# Patient Record
Sex: FEMALE
[De-identification: names, ages, dates, MRNs, and addresses within clinical notes are randomized per-mention and may not be internally consistent; named-entity substitution may affect disease eponyms.]

---

## 2017-12-30 ENCOUNTER — HOSPITAL ENCOUNTER (EMERGENCY)
Dept: HOSPITAL 31 - C.ER | Age: 22
Discharge: HOME | End: 2017-12-30
Payer: MEDICAID

## 2017-12-30 VITALS — DIASTOLIC BLOOD PRESSURE: 84 MMHG | HEART RATE: 70 BPM | SYSTOLIC BLOOD PRESSURE: 115 MMHG

## 2017-12-30 VITALS — OXYGEN SATURATION: 97 %

## 2017-12-30 VITALS — RESPIRATION RATE: 16 BRPM | TEMPERATURE: 98.2 F

## 2017-12-30 DIAGNOSIS — S06.0X0A: Primary | ICD-10-CM
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
## 2017-12-30 NOTE — C.PDOC
History Of Present Illness


22 year old female presents to the ED c/o pain in the back of her head SP 

falling. Patient reports she was pulled by her dog which caused her to fall 

back on her stairs and hit the back of her head. Patient reports that when she 

went back to her apartment she vomited once, but reports she was able to 

tolerate fluids PTA. Patient denies LOC, weakness, numbness, blurry vision, 

other injury. 





- HPI


Time Seen by Provider: 12/30/17 20:56


Chief Complaint (Nursing): Trauma


History Per: Patient


History/Exam Limitations: no limitations


Onset/Duration Of Symptoms: Hrs


Injury Occurred (Timing): Hours Ago:


Location Of Injury: Posterior: Head


Severity: None


Recent travel outside of the [place]: No


Additional History Per: Patient





- Fall


Fall:Prior To Injury: Lost Balance





Past Medical History


Reviewed: Historical Data, Nursing Documentation, Vital Signs


Vital Signs: 


 Last Vital Signs











Temp  98.2 F   12/30/17 20:47


 


Pulse  70   12/30/17 21:47


 


Resp  16   12/30/17 21:47


 


BP  115/84   12/30/17 21:47


 


Pulse Ox  98   12/30/17 21:47














- Medical History


PMH: No Chronic Diseases


Surgical History: No Surg Hx


Family History: States: Unknown Family Hx





- Social History


Hx Alcohol Use: Yes


Hx Substance Use: No





- Immunization History


Hx Tetanus Toxoid Vaccination: No


Hx Influenza Vaccination: No





Review Of Systems


Constitutional: Negative for: Fever, Chills


Eyes: Negative for: Vision Change


Cardiovascular: Negative for: Chest Pain, Palpitations


Respiratory: Negative for: Cough, Shortness of Breath


Gastrointestinal: Positive for: Vomiting.  Negative for: Nausea, Abdominal Pain


Musculoskeletal: Negative for: Neck Pain


Neurological: Positive for: Headache.  Negative for: Weakness, Numbness, 

Dizziness





Physical Exam





- Physical Exam


Appears: Non-toxic, No Acute Distress


Skin: Normal Color, Warm, Dry


Head: Normacephalic, Tenderness (occipital area), Other (no palpable hematoma)


Eye(s): bilateral: Normal Inspection, PERRL, EOMI


Nose: No Discharge, No Deformity


Oral Mucosa: Moist


Neck: Normal ROM, No Midline Cervical Tenderness, No Paracervical Tenderness, 

Supple


Chest: Symmetrical


Cardiovascular: Rhythm Regular, No Murmur


Respiratory: Normal Breath Sounds, No Rales, No Rhonchi, No Wheezing


Gastrointestinal/Abdominal: Soft, No Tenderness


Back: Normal Inspection


Extremity: Normal ROM, No Pedal Edema, No Calf Tenderness, No Deformity, No 

Swelling


Neurological/Psych: Oriented x3, Normal Speech, Normal Cognition, Normal Motor, 

Normal Sensation


Gait: Steady





ED Course And Treatment


O2 Sat by Pulse Oximetry: 97 (On RA)


Pulse Ox Interpretation: Normal


Progress Note: Plan:  -Tylenol 975 mg PO.  I discussed the risk (radiation) and 

benefit (finding a problem needing surgery) with the patient.  The patient is 

acting normally and has a normal neurological exam. The likelihood of finding a 

lesion needing intervention on the CT scan is extremely low.  Patient agrees 

that at this time no CT scan will be done.  If there is any change or new 

concern, the patient will return to the ED for further evaluation.





Disposition


Counseled Patient/Family Regarding: Diagnosis, Need For Followup, Rx Given





- Disposition


Disposition: HOME/ ROUTINE


Disposition Time: 21:32


Condition: STABLE


Additional Instructions: 


Take tylenol or advil for pain





Follow up with PMD 





Return to ER if worse  


Instructions:  Concussion (ED)


Forms:  [facility] (English)


Print Language: Moldovan





- Clinical Impression


Clinical Impression: 


 Concussion with no loss of consciousness, Head injury








- PA / NP / Resident Statement


MD/DO has reviewed & agrees with the documentation as recorded.





- Scribe Statement


The provider has reviewed the documentation as recorded by the Scribe





Sha Cortes





All medical record entries made by the Scribe were at my direction and 

personally dictated by me. I have reviewed the chart and agree that the record 

accurately reflects my personal performance of the history, physical exam, 

medical decision making, and the department course for this patient. I have 

also personally directed, reviewed, and agree with the discharge instructions 

and disposition.

## 2018-01-09 ENCOUNTER — HOSPITAL ENCOUNTER (EMERGENCY)
Dept: HOSPITAL 31 - C.ER | Age: 23
Discharge: HOME | End: 2018-01-09
Payer: MEDICAID

## 2018-01-09 VITALS — BODY MASS INDEX: 28.3 KG/M2

## 2018-01-09 VITALS
TEMPERATURE: 98.9 F | SYSTOLIC BLOOD PRESSURE: 124 MMHG | DIASTOLIC BLOOD PRESSURE: 85 MMHG | RESPIRATION RATE: 18 BRPM | OXYGEN SATURATION: 99 % | HEART RATE: 81 BPM

## 2018-01-09 DIAGNOSIS — R10.13: Primary | ICD-10-CM

## 2018-01-09 DIAGNOSIS — R11.2: ICD-10-CM

## 2018-01-09 LAB
ALBUMIN SERPL-MCNC: 4.5 G/DL (ref 3.5–5)
ALBUMIN/GLOB SERPL: 1.3 {RATIO} (ref 1–2.1)
ALT SERPL-CCNC: 46 U/L (ref 9–52)
AST SERPL-CCNC: 30 U/L (ref 14–36)
BASOPHILS # BLD AUTO: 0.1 K/UL (ref 0–0.2)
BASOPHILS NFR BLD: 0.7 % (ref 0–2)
BILIRUB UR-MCNC: NEGATIVE MG/DL
BUN SERPL-MCNC: 11 MG/DL (ref 7–17)
CALCIUM SERPL-MCNC: 8.5 MG/DL (ref 8.6–10.4)
EOSINOPHIL # BLD AUTO: 0.1 K/UL (ref 0–0.7)
EOSINOPHIL NFR BLD: 1.3 % (ref 0–4)
ERYTHROCYTE [DISTWIDTH] IN BLOOD BY AUTOMATED COUNT: 13.8 % (ref 11.5–14.5)
GFR NON-AFRICAN AMERICAN: > 60
GLUCOSE UR STRIP-MCNC: NORMAL MG/DL
HCG,QUALITATIVE URINE: NEGATIVE
HGB BLD-MCNC: 14 G/DL (ref 11–16)
LEUKOCYTE ESTERASE UR-ACNC: (no result) LEU/UL
LYMPHOCYTES # BLD AUTO: 1.3 K/UL (ref 1–4.3)
LYMPHOCYTES NFR BLD AUTO: 11.9 % (ref 20–40)
MCH RBC QN AUTO: 29 PG (ref 27–31)
MCHC RBC AUTO-ENTMCNC: 33.6 G/DL (ref 33–37)
MCV RBC AUTO: 86.5 FL (ref 81–99)
MONOCYTES # BLD: 0.6 K/UL (ref 0–0.8)
MONOCYTES NFR BLD: 5.5 % (ref 0–10)
NEUTROPHILS # BLD: 8.9 K/UL (ref 1.8–7)
NEUTROPHILS NFR BLD AUTO: 80.6 % (ref 50–75)
NRBC BLD AUTO-RTO: 0 % (ref 0–2)
PH UR STRIP: 7 [PH] (ref 5–8)
PLATELET # BLD: 239 K/UL (ref 130–400)
PMV BLD AUTO: 10.4 FL (ref 7.2–11.7)
PROT UR STRIP-MCNC: NEGATIVE MG/DL
RBC # BLD AUTO: 4.82 MIL/UL (ref 3.8–5.2)
RBC # UR STRIP: NEGATIVE /UL
SP GR UR STRIP: 1.02 (ref 1–1.03)
SQUAMOUS EPITHIAL: 3 /HPF (ref 0–5)
URINE NITRATE: NEGATIVE
UROBILINOGEN UR-MCNC: NORMAL MG/DL (ref 0.2–1)
WBC # BLD AUTO: 11 K/UL (ref 4.8–10.8)

## 2018-01-09 PROCEDURE — 96375 TX/PRO/DX INJ NEW DRUG ADDON: CPT

## 2018-01-09 PROCEDURE — 96374 THER/PROPH/DIAG INJ IV PUSH: CPT

## 2018-01-09 PROCEDURE — 81001 URINALYSIS AUTO W/SCOPE: CPT

## 2018-01-09 PROCEDURE — 80053 COMPREHEN METABOLIC PANEL: CPT

## 2018-01-09 PROCEDURE — 87804 INFLUENZA ASSAY W/OPTIC: CPT

## 2018-01-09 PROCEDURE — 85025 COMPLETE CBC W/AUTO DIFF WBC: CPT

## 2018-01-09 PROCEDURE — 84703 CHORIONIC GONADOTROPIN ASSAY: CPT

## 2018-01-09 PROCEDURE — 99284 EMERGENCY DEPT VISIT MOD MDM: CPT

## 2018-01-09 NOTE — C.PDOC
History Of Present Illness


22 year old female, with no significant PMHx presents to ED for evaluation of 

body aches, chills, epigastric abdominal pain associated with nausea, 1 episode 

of non-bilious vomiting, and few episodes of watery diarrhea since 3:00 this 

morning. Denies fever, severe headache, dizziness, neck pain, drooling, 

dysphagia, dyspnea, chest pain, shortness of breath, cough, palpitation, 

hematemesis, dysuria, hematuria, urinary frequency, or back pain. Ambulate to 

ED for evaluation, not in any apparent distress.





Time Seen by Provider: 01/09/18 10:53


Chief Complaint (Nursing): Abdominal Pain


History Per: Patient


History/Exam Limitations: no limitations


Onset/Duration Of Symptoms: Hrs


Current Symptoms Are (Timing): Still Present


Location Of Pain/Discomfort: Epigastric


Quality Of Discomfort: "Pain"


Associated Symptoms: Chills, Nausea, Vomiting, Diarrhea.  denies: Loss Of 

Appetite, Back Pain, Chest Pain, Constipation, Urinary Symptoms


Exacerbating Factors: None


Alleviating Factors: None


Recent travel outside of the [place]: No


Additional History Per: Patient


Abnormal Vaginal Bleeding: No





Past Medical History


Reviewed: Historical Data, Nursing Documentation, Vital Signs


Vital Signs: 


 Last Vital Signs











Temp  98.9 F   01/09/18 13:28


 


Pulse  81   01/09/18 13:28


 


Resp  18   01/09/18 13:28


 


BP  124/85   01/09/18 13:28


 


Pulse Ox  99   01/09/18 13:28











Family History: States: Unknown Family Hx





- Social History


Hx Alcohol Use: Yes


Hx Substance Use: No





- Immunization History


Hx Tetanus Toxoid Vaccination: No


Hx Influenza Vaccination: No


Hx Pneumococcal Vaccination: No





Review Of Systems


Except As Marked, All Systems Reviewed And Found Negative.


Constitutional: Positive for: Chills, Other (body aches).  Negative for: Fever


Cardiovascular: Negative for: Chest Pain, Palpitations, Light Headedness


Respiratory: Negative for: Cough, Shortness of Breath


Gastrointestinal: Positive for: Nausea, Vomiting, Abdominal Pain, Diarrhea.  

Negative for: Constipation, Hematochezia, Hematemesis


Genitourinary: Negative for: Dysuria, Frequency, Hematuria, Vaginal Discharge


Musculoskeletal: Negative for: Back Pain





Physical Exam





- Physical Exam


Appears: Non-toxic, No Acute Distress


Skin: Normal Color, Warm, Dry, No Rash


Head: Normacephalic


Eye(s): bilateral: PERRL


Oral Mucosa: Moist, No Drooling


Neck: Supple


Cardiovascular: Rhythm Regular, No Murmur


Respiratory: No Accessory Muscle Use, No Rales, No Rhonchi, No Wheezing


Gastrointestinal/Abdominal: Soft, Tenderness (mild epigastric), No Guarding, No 

Rebound


Back: No CVA Tenderness


Extremity: Normal ROM, No Pedal Edema, No Deformity


Neurological/Psych: Oriented x3, Normal Speech





ED Course And Treatment





- Laboratory Results


Result Diagrams: 


 01/09/18 11:48





 01/09/18 11:48


Lab Interpretation: No Acute Changes


Urine Pregnancy POC: Negative


O2 Sat by Pulse Oximetry: 99 (RA)


Pulse Ox Interpretation: Normal


Progress Note: Blood work, urinalysis, Influenza AB was ordered and reviewed. 

Pt was given Zofran, Pepcid, Toradol, Carafate, and IV fluids.  Pt was OBS in 

Ed for 3 hours and remained stable.  On re-evaluation, pt is afebrile, 

hemodynamicaly stable.  Non-toxic. Tolerate Po well in Ed.  Ambulatory in Ed 

with stable gait.  PulseOx 99% RA.  ENT: no acute findings.  neck: Supple, (-) 

midline tenderness, (-) JVD, (-) carotid bruits B/L.  Lungs: CTA B/L, BS equal B

/L.  CVS: (+)S1S2, reg.  Abd: benign,  (-) guarding or rebound, (-) localized 

tenderness.  Back: (-) CVA tenderness.  Blood work review, appears normal.  UA 

normal, (-) preg.  Influenza (-).  Pt has clinical findings c/w epigastric pain

, N/V r/o viral illness.  Pt advised.  ref. to F/u with PMD, GI  in2 -3 days 

for re-eavl.  return to ED if any worsening ornew changes.





Disposition


Counseled Patient/Family Regarding: Studies Performed, Diagnosis, Need For 

Followup, Rx Given





- Disposition


Referrals: 


[facility] [Outside]


Disposition: HOME/ ROUTINE


Disposition Time: 12:34


Condition: STABLE


Additional Instructions: 


ENCOURAGE FLUIDS





DIET RESTRICTION FOR 1-2 DAYS





TAKE MEDICATION AS NEED





FOLLOW UP WITH PMD, GI IN 2-3 DAYS FOR RE-EVALUATION.


RETURN TO ED IF ANY WORSENING OR NEW CHANGES.


Prescriptions: 


Famotidine [Pepcid] 20 mg PO BID #10 tab


Instructions:  Acute Nausea and Vomiting (ED), Epigastric Pain (ED)


Forms:  [facility] (English)


Print Language: Dominican





- Clinical Impression


Clinical Impression: 


 Nausea, Vomiting, Epigastric pain








- PA / NP / Resident Statement


MD/DO has reviewed & agrees with the documentation as recorded.





- Scribe Statement


The provider has reviewed the documentation as recorded by the Inésibbrett Ball





All medical record entries made by the Inésibbrett were at my direction and 

personally dictated by me. I have reviewed the chart and agree that the record 

accurately reflects my personal performance of the history, physical exam, 

medical decision making, and the department course for this patient. I have 

also personally directed, reviewed, and agree with the discharge instructions 

and disposition.

## 2018-01-11 ENCOUNTER — HOSPITAL ENCOUNTER (EMERGENCY)
Dept: HOSPITAL 31 - C.ER | Age: 23
Discharge: HOME | End: 2018-01-11
Payer: MEDICAID

## 2018-01-11 VITALS
SYSTOLIC BLOOD PRESSURE: 137 MMHG | DIASTOLIC BLOOD PRESSURE: 83 MMHG | OXYGEN SATURATION: 99 % | HEART RATE: 72 BPM | TEMPERATURE: 98.2 F

## 2018-01-11 VITALS — BODY MASS INDEX: 28.3 KG/M2

## 2018-01-11 VITALS — RESPIRATION RATE: 20 BRPM

## 2018-01-11 DIAGNOSIS — S06.0X0A: Primary | ICD-10-CM

## 2018-01-11 DIAGNOSIS — R51: ICD-10-CM

## 2018-01-11 LAB
BILIRUB UR-MCNC: NEGATIVE MG/DL
GLUCOSE UR STRIP-MCNC: NORMAL MG/DL
HCG,QUALITATIVE URINE: NEGATIVE
LEUKOCYTE ESTERASE UR-ACNC: (no result) LEU/UL
PH UR STRIP: 5 [PH] (ref 5–8)
PROT UR STRIP-MCNC: NEGATIVE MG/DL
RBC # UR STRIP: NEGATIVE /UL
SP GR UR STRIP: 1.03 (ref 1–1.03)
SQUAMOUS EPITHIAL: 20 /HPF (ref 0–5)
URINE NITRATE: NEGATIVE
UROBILINOGEN UR-MCNC: 2 MG/DL (ref 0.2–1)

## 2018-01-11 NOTE — CT
PROCEDURE:  CT HEAD WITHOUT CONTRAST.



HISTORY:

fall



COMPARISON:

None available. 



TECHNIQUE:

Axial computed tomography images were obtained through the head/brain 

without intravenous contrast.  



Radiation dose:



Total exam DLP = 812 mGy-cm.



This CT exam was performed using one or more of the following dose 

reduction techniques: Automated exposure control, adjustment of the 

mA and/or kV according to patient size, and/or use of iterative 

reconstruction technique.



FINDINGS:



HEMORRHAGE:

No intracranial hemorrhage. 



BRAIN:

No mass effect or edema.  No atrophy or chronic microvascular 

ischemic changes.



VENTRICLES:

Unremarkable. No hydrocephalus. 



CALVARIUM:

Unremarkable.



PARANASAL SINUSES:

Unremarkable as visualized. No significant inflammatory changes.



MASTOID AIR CELLS:

Unremarkable as visualized. No inflammatory changes.



OTHER FINDINGS:

None.



IMPRESSION:

No acute intracranial abnormality. 



If symptoms persists, consider MRI.

## 2018-01-11 NOTE — C.PDOC
History Of Present Illness


Presents to ED with complaints of headache and nausea that began after falling 

and hitting her head yesterday. Pt was seen last week in [facility] ED and she was 

discharged with concussion instructions. Denies fever, chills or vomiting.


Time Seen by Provider: 01/11/18 06:25


Chief Complaint (Nursing): Headache


History Per: Patient


History/Exam Limitations: no limitations


Onset/Duration Of Symptoms: Hrs


Current Symptoms Are (Timing): Still Present


Severity: Mild


Pain Scale Rating Of: 3


Preceeding Symptoms: None


Associated Symptoms: Nausea.  denies: Vomiting


Recent travel outside of the [place]: No





Past Medical History


Reviewed: Historical Data, Nursing Documentation, Vital Signs


Vital Signs: 


 Last Vital Signs











Temp  99 F   01/11/18 05:46


 


Pulse  80   01/11/18 05:46


 


Resp  20   01/11/18 05:46


 


BP  142/99 H  01/11/18 05:46


 


Pulse Ox  98   01/11/18 06:40














- Medical History


PMH: No Chronic Diseases


Surgical History: No Surg Hx


Family History: States: No Known Family Hx





- Social History


Hx Alcohol Use: Yes


Hx Substance Use: No





- Immunization History


Hx Tetanus Toxoid Vaccination: No


Hx Influenza Vaccination: No


Hx Pneumococcal Vaccination: No





Review Of Systems


Constitutional: Negative for: Fever, Chills


Gastrointestinal: Positive for: Nausea.  Negative for: Vomiting


Neurological: Positive for: Headache.  Negative for: Weakness, Numbness


Psych: Negative for: Anxiety, Suicidal ideation





Physical Exam





- Physical Exam


Appears: Non-toxic, No Acute Distress


Skin: Warm, Dry


Head: Normacephalic


Eye(s): bilateral: Normal Inspection


Oral Mucosa: Moist


Neck: Trachea Midline, Supple


Chest: Symmetrical, No Tenderness


Cardiovascular: Rhythm Regular


Respiratory: No Rales, No Rhonchi, No Wheezing


Gastrointestinal/Abdominal: Soft, No Tenderness


Neurological/Psych: Oriented x3, Normal Speech, Normal Cognition, Other (No 

focal deficits )





ED Course And Treatment


O2 Sat by Pulse Oximetry: 98 (RA)


Pulse Ox Interpretation: Normal


Progress Note: Administered Zofran ODT. Ordered CT of head and urinalysis.





Disposition


Counseled Patient/Family Regarding: Studies Performed, Diagnosis





- Disposition


Disposition Time: 06:25


Condition: UNKNOWN


Forms:  [facility] (English)





- Clinical Impression


Clinical Impression: 


 Headache, Concussion with no loss of consciousness








- Scribe Statement


The provider has reviewed the documentation as recorded by the Inésibe





Annita Sandoval





All medical record entries made by the Scribbrett were at my direction and 

personally dictated by me. I have reviewed the chart and agree that the record 

accurately reflects my personal performance of the history, physical exam, 

medical decision making, and the department course for this patient. I have 

also personally directed, reviewed, and agree with the discharge instructions 

and disposition.





Physician Patient Turnover


Patient Signed Over To: Nat Camacho


Handoff Comments: pending ct scan and re-eval

## 2018-12-08 ENCOUNTER — HOSPITAL ENCOUNTER (EMERGENCY)
Dept: HOSPITAL 14 - H.ER | Age: 23
Discharge: HOME | End: 2018-12-08
Payer: MEDICAID

## 2018-12-08 VITALS — BODY MASS INDEX: 24 KG/M2

## 2018-12-08 VITALS — SYSTOLIC BLOOD PRESSURE: 112 MMHG | TEMPERATURE: 98.4 F | HEART RATE: 80 BPM | DIASTOLIC BLOOD PRESSURE: 62 MMHG

## 2018-12-08 VITALS — RESPIRATION RATE: 18 BRPM

## 2018-12-08 DIAGNOSIS — Z87.891: ICD-10-CM

## 2018-12-08 DIAGNOSIS — O20.9: Primary | ICD-10-CM

## 2018-12-08 DIAGNOSIS — Z3A.01: ICD-10-CM

## 2018-12-08 DIAGNOSIS — O26.891: ICD-10-CM

## 2018-12-10 VITALS — OXYGEN SATURATION: 99 %

## 2019-01-19 ENCOUNTER — HOSPITAL ENCOUNTER (EMERGENCY)
Dept: HOSPITAL 14 - H.ER | Age: 24
Discharge: HOME | End: 2019-01-19
Payer: MEDICAID

## 2019-01-19 VITALS — BODY MASS INDEX: 24 KG/M2

## 2019-01-19 DIAGNOSIS — O26.899: Primary | ICD-10-CM

## 2019-01-19 LAB
BACTERIA #/AREA URNS HPF: (no result) /[HPF]
BILIRUB UR-MCNC: NEGATIVE MG/DL
COLOR UR: (no result)
GLUCOSE UR STRIP-MCNC: (no result) MG/DL
LEUKOCYTE ESTERASE UR-ACNC: (no result) LEU/UL
PH UR STRIP: 6 [PH] (ref 5–8)
PROT UR STRIP-MCNC: 30 MG/DL
RBC # UR STRIP: NEGATIVE /UL
SP GR UR STRIP: 1.03 (ref 1–1.03)
SQUAMOUS EPITHIAL: 17 /HPF (ref 0–5)
URINE CLARITY: (no result)
UROBILINOGEN UR-MCNC: (no result) MG/DL (ref 0.2–1)

## 2019-01-20 VITALS
HEART RATE: 84 BPM | OXYGEN SATURATION: 100 % | SYSTOLIC BLOOD PRESSURE: 118 MMHG | DIASTOLIC BLOOD PRESSURE: 77 MMHG | TEMPERATURE: 98.7 F | RESPIRATION RATE: 18 BRPM

## 2019-03-12 ENCOUNTER — HOSPITAL ENCOUNTER (EMERGENCY)
Dept: HOSPITAL 14 - H.ER | Age: 24
Discharge: HOME | End: 2019-03-12
Payer: COMMERCIAL

## 2019-03-12 VITALS — RESPIRATION RATE: 18 BRPM | SYSTOLIC BLOOD PRESSURE: 121 MMHG | HEART RATE: 88 BPM | DIASTOLIC BLOOD PRESSURE: 71 MMHG

## 2019-03-12 VITALS — TEMPERATURE: 98.3 F

## 2019-03-12 VITALS — BODY MASS INDEX: 24 KG/M2

## 2019-03-12 DIAGNOSIS — R20.2: Primary | ICD-10-CM

## 2019-03-12 DIAGNOSIS — M25.511: ICD-10-CM

## 2019-03-12 DIAGNOSIS — M25.512: ICD-10-CM

## 2019-03-12 LAB
ALBUMIN SERPL-MCNC: 3.8 G/DL (ref 3.5–5)
ALBUMIN/GLOB SERPL: 1.1 {RATIO} (ref 1–2.1)
ALT SERPL-CCNC: 31 U/L (ref 9–52)
AST SERPL-CCNC: 21 U/L (ref 14–36)
BASOPHILS # BLD AUTO: 0 K/UL (ref 0–0.2)
BASOPHILS NFR BLD: 0.3 % (ref 0–2)
BUN SERPL-MCNC: 7 MG/DL (ref 7–17)
CALCIUM SERPL-MCNC: 9.4 MG/DL (ref 8.4–10.2)
EOSINOPHIL # BLD AUTO: 0.2 K/UL (ref 0–0.7)
EOSINOPHIL NFR BLD: 2 % (ref 0–4)
ERYTHROCYTE [DISTWIDTH] IN BLOOD BY AUTOMATED COUNT: 14 % (ref 11.5–14.5)
GFR NON-AFRICAN AMERICAN: > 60
HGB BLD-MCNC: 11.8 G/DL (ref 12–16)
LYMPHOCYTES # BLD AUTO: 1.4 K/UL (ref 1–4.3)
LYMPHOCYTES NFR BLD AUTO: 12.8 % (ref 20–40)
MCH RBC QN AUTO: 29.2 PG (ref 27–31)
MCHC RBC AUTO-ENTMCNC: 33.7 G/DL (ref 33–37)
MCV RBC AUTO: 86.8 FL (ref 81–99)
MONOCYTES # BLD: 0.7 K/UL (ref 0–0.8)
MONOCYTES NFR BLD: 6.8 % (ref 0–10)
NEUTROPHILS # BLD: 8.6 K/UL (ref 1.8–7)
NEUTROPHILS NFR BLD AUTO: 78.1 % (ref 50–75)
NRBC BLD AUTO-RTO: 0 % (ref 0–0)
PLATELET # BLD: 207 K/UL (ref 130–400)
PMV BLD AUTO: 9.9 FL (ref 7.2–11.7)
RBC # BLD AUTO: 4.03 MIL/UL (ref 3.8–5.2)
WBC # BLD AUTO: 11 K/UL (ref 4.8–10.8)

## 2019-03-12 PROCEDURE — 80053 COMPREHEN METABOLIC PANEL: CPT

## 2019-03-12 PROCEDURE — 99284 EMERGENCY DEPT VISIT MOD MDM: CPT

## 2019-03-12 PROCEDURE — 84100 ASSAY OF PHOSPHORUS: CPT

## 2019-03-12 PROCEDURE — 96360 HYDRATION IV INFUSION INIT: CPT

## 2019-03-12 PROCEDURE — 72141 MRI NECK SPINE W/O DYE: CPT

## 2019-03-12 PROCEDURE — 83735 ASSAY OF MAGNESIUM: CPT

## 2019-03-12 PROCEDURE — 81025 URINE PREGNANCY TEST: CPT

## 2019-03-12 PROCEDURE — 85025 COMPLETE CBC W/AUTO DIFF WBC: CPT

## 2019-03-12 PROCEDURE — 82948 REAGENT STRIP/BLOOD GLUCOSE: CPT

## 2019-03-14 VITALS — OXYGEN SATURATION: 100 %

## 2019-05-22 ENCOUNTER — HOSPITAL ENCOUNTER (EMERGENCY)
Dept: HOSPITAL 14 - H.EROB2 | Age: 24
Discharge: HOME | End: 2019-05-22
Payer: MEDICAID

## 2019-05-22 VITALS — DIASTOLIC BLOOD PRESSURE: 63 MMHG | OXYGEN SATURATION: 99 % | HEART RATE: 94 BPM | SYSTOLIC BLOOD PRESSURE: 102 MMHG

## 2019-05-22 VITALS — BODY MASS INDEX: 33 KG/M2

## 2019-05-22 DIAGNOSIS — Z3A.29: ICD-10-CM

## 2019-05-22 DIAGNOSIS — O21.0: Primary | ICD-10-CM

## 2019-05-22 LAB
ALBUMIN SERPL-MCNC: 3.7 G/DL (ref 3.5–5)
ALBUMIN/GLOB SERPL: 1.2 {RATIO} (ref 1–2.1)
ALT SERPL-CCNC: 21 U/L (ref 9–52)
AST SERPL-CCNC: 26 U/L (ref 14–36)
BILIRUBIN,DIRECT: 0.3 MG/ML (ref 0–0.4)
BUN SERPL-MCNC: 5 MG/DL (ref 7–17)
CALCIUM SERPL-MCNC: 9 MG/DL (ref 8.4–10.2)
ERYTHROCYTE [DISTWIDTH] IN BLOOD BY AUTOMATED COUNT: 13.6 % (ref 11.5–14.5)
GFR NON-AFRICAN AMERICAN: > 60
HGB BLD-MCNC: 11.2 G/DL (ref 12–16)
MCH RBC QN AUTO: 29 PG (ref 27–31)
MCHC RBC AUTO-ENTMCNC: 33.5 G/DL (ref 33–37)
MCV RBC AUTO: 86.8 FL (ref 81–99)
PLATELET # BLD: 216 K/UL (ref 130–400)
RBC # BLD AUTO: 3.87 MIL/UL (ref 3.8–5.2)
WBC # BLD AUTO: 11.2 K/UL (ref 4.8–10.8)

## 2019-05-22 PROCEDURE — 96374 THER/PROPH/DIAG INJ IV PUSH: CPT

## 2019-05-22 PROCEDURE — 80053 COMPREHEN METABOLIC PANEL: CPT

## 2019-05-22 PROCEDURE — 99283 EMERGENCY DEPT VISIT LOW MDM: CPT

## 2019-05-22 PROCEDURE — 96361 HYDRATE IV INFUSION ADD-ON: CPT

## 2019-05-22 PROCEDURE — 85027 COMPLETE CBC AUTOMATED: CPT
